# Patient Record
Sex: MALE | Race: WHITE | NOT HISPANIC OR LATINO | URBAN - METROPOLITAN AREA
[De-identification: names, ages, dates, MRNs, and addresses within clinical notes are randomized per-mention and may not be internally consistent; named-entity substitution may affect disease eponyms.]

---

## 2023-10-07 ENCOUNTER — EMERGENCY (EMERGENCY)
Facility: HOSPITAL | Age: 88
LOS: 1 days | Discharge: HOME | End: 2023-10-07
Attending: EMERGENCY MEDICINE
Payer: MEDICARE

## 2023-10-07 VITALS
HEIGHT: 73 IN | TEMPERATURE: 98 F | OXYGEN SATURATION: 98 % | WEIGHT: 205.03 LBS | HEART RATE: 75 BPM | SYSTOLIC BLOOD PRESSURE: 134 MMHG | RESPIRATION RATE: 20 BRPM | DIASTOLIC BLOOD PRESSURE: 83 MMHG

## 2023-10-07 LAB
ALBUMIN SERPL ELPH-MCNC: 4 G/DL — SIGNIFICANT CHANGE UP (ref 3.3–5.2)
ALP SERPL-CCNC: 69 U/L — SIGNIFICANT CHANGE UP (ref 40–120)
ALT FLD-CCNC: 15 U/L — SIGNIFICANT CHANGE UP
ANION GAP SERPL CALC-SCNC: 14 MMOL/L — SIGNIFICANT CHANGE UP (ref 5–17)
AST SERPL-CCNC: 22 U/L — SIGNIFICANT CHANGE UP
BASOPHILS # BLD AUTO: 0.03 K/UL — SIGNIFICANT CHANGE UP (ref 0–0.2)
BASOPHILS NFR BLD AUTO: 0.5 % — SIGNIFICANT CHANGE UP (ref 0–2)
BILIRUB SERPL-MCNC: 0.6 MG/DL — SIGNIFICANT CHANGE UP (ref 0.4–2)
BUN SERPL-MCNC: 18.9 MG/DL — SIGNIFICANT CHANGE UP (ref 8–20)
CALCIUM SERPL-MCNC: 9.4 MG/DL — SIGNIFICANT CHANGE UP (ref 8.4–10.5)
CHLORIDE SERPL-SCNC: 101 MMOL/L — SIGNIFICANT CHANGE UP (ref 96–108)
CO2 SERPL-SCNC: 22 MMOL/L — SIGNIFICANT CHANGE UP (ref 22–29)
CREAT SERPL-MCNC: 1.02 MG/DL — SIGNIFICANT CHANGE UP (ref 0.5–1.3)
EGFR: 72 ML/MIN/1.73M2 — SIGNIFICANT CHANGE UP
EOSINOPHIL # BLD AUTO: 0.29 K/UL — SIGNIFICANT CHANGE UP (ref 0–0.5)
EOSINOPHIL NFR BLD AUTO: 5 % — SIGNIFICANT CHANGE UP (ref 0–6)
GLUCOSE SERPL-MCNC: 116 MG/DL — HIGH (ref 70–99)
HCT VFR BLD CALC: 39.8 % — SIGNIFICANT CHANGE UP (ref 39–50)
HGB BLD-MCNC: 13.5 G/DL — SIGNIFICANT CHANGE UP (ref 13–17)
IMM GRANULOCYTES NFR BLD AUTO: 0.5 % — SIGNIFICANT CHANGE UP (ref 0–0.9)
LIDOCAIN IGE QN: 23 U/L — SIGNIFICANT CHANGE UP (ref 22–51)
LYMPHOCYTES # BLD AUTO: 1.47 K/UL — SIGNIFICANT CHANGE UP (ref 1–3.3)
LYMPHOCYTES # BLD AUTO: 25.4 % — SIGNIFICANT CHANGE UP (ref 13–44)
MCHC RBC-ENTMCNC: 30.3 PG — SIGNIFICANT CHANGE UP (ref 27–34)
MCHC RBC-ENTMCNC: 33.9 GM/DL — SIGNIFICANT CHANGE UP (ref 32–36)
MCV RBC AUTO: 89.2 FL — SIGNIFICANT CHANGE UP (ref 80–100)
MONOCYTES # BLD AUTO: 0.45 K/UL — SIGNIFICANT CHANGE UP (ref 0–0.9)
MONOCYTES NFR BLD AUTO: 7.8 % — SIGNIFICANT CHANGE UP (ref 2–14)
NEUTROPHILS # BLD AUTO: 3.52 K/UL — SIGNIFICANT CHANGE UP (ref 1.8–7.4)
NEUTROPHILS NFR BLD AUTO: 60.8 % — SIGNIFICANT CHANGE UP (ref 43–77)
NT-PROBNP SERPL-SCNC: 878 PG/ML — HIGH (ref 0–300)
PLATELET # BLD AUTO: 142 K/UL — LOW (ref 150–400)
POTASSIUM SERPL-MCNC: 4.2 MMOL/L — SIGNIFICANT CHANGE UP (ref 3.5–5.3)
POTASSIUM SERPL-SCNC: 4.2 MMOL/L — SIGNIFICANT CHANGE UP (ref 3.5–5.3)
PROT SERPL-MCNC: 7.2 G/DL — SIGNIFICANT CHANGE UP (ref 6.6–8.7)
RBC # BLD: 4.46 M/UL — SIGNIFICANT CHANGE UP (ref 4.2–5.8)
RBC # FLD: 13.1 % — SIGNIFICANT CHANGE UP (ref 10.3–14.5)
SODIUM SERPL-SCNC: 137 MMOL/L — SIGNIFICANT CHANGE UP (ref 135–145)
TROPONIN T SERPL-MCNC: <0.01 NG/ML — SIGNIFICANT CHANGE UP (ref 0–0.06)
WBC # BLD: 5.79 K/UL — SIGNIFICANT CHANGE UP (ref 3.8–10.5)
WBC # FLD AUTO: 5.79 K/UL — SIGNIFICANT CHANGE UP (ref 3.8–10.5)

## 2023-10-07 PROCEDURE — 73030 X-RAY EXAM OF SHOULDER: CPT

## 2023-10-07 PROCEDURE — 36415 COLL VENOUS BLD VENIPUNCTURE: CPT

## 2023-10-07 PROCEDURE — 83880 ASSAY OF NATRIURETIC PEPTIDE: CPT

## 2023-10-07 PROCEDURE — 84484 ASSAY OF TROPONIN QUANT: CPT

## 2023-10-07 PROCEDURE — 93005 ELECTROCARDIOGRAM TRACING: CPT

## 2023-10-07 PROCEDURE — 99285 EMERGENCY DEPT VISIT HI MDM: CPT | Mod: 25

## 2023-10-07 PROCEDURE — 73030 X-RAY EXAM OF SHOULDER: CPT | Mod: 26,LT

## 2023-10-07 PROCEDURE — 71045 X-RAY EXAM CHEST 1 VIEW: CPT

## 2023-10-07 PROCEDURE — 71045 X-RAY EXAM CHEST 1 VIEW: CPT | Mod: 26

## 2023-10-07 PROCEDURE — 93010 ELECTROCARDIOGRAM REPORT: CPT

## 2023-10-07 PROCEDURE — 99285 EMERGENCY DEPT VISIT HI MDM: CPT

## 2023-10-07 PROCEDURE — 80053 COMPREHEN METABOLIC PANEL: CPT

## 2023-10-07 PROCEDURE — 85025 COMPLETE CBC W/AUTO DIFF WBC: CPT

## 2023-10-07 PROCEDURE — 83690 ASSAY OF LIPASE: CPT

## 2023-10-07 RX ORDER — METHOCARBAMOL 500 MG/1
1000 TABLET, FILM COATED ORAL ONCE
Refills: 0 | Status: COMPLETED | OUTPATIENT
Start: 2023-10-07 | End: 2023-10-07

## 2023-10-07 RX ADMIN — METHOCARBAMOL 1000 MILLIGRAM(S): 500 TABLET, FILM COATED ORAL at 12:08

## 2023-10-07 NOTE — ED ADULT TRIAGE NOTE - CHIEF COMPLAINT QUOTE
BIBA from home c/o  non traumatic L shoulder pain for couple of weeks. Pt schedule for CT angio and eccho on Tuesday due to needs for valve replacement

## 2023-10-07 NOTE — ED PROVIDER NOTE - OBJECTIVE STATEMENT
88yom p/w left shoulder pain. Had a sharp pain in the left scapula that woke him from sleep this morning and has been lingering since. Has been getting pain like this on and off for a few weeks, no particular inciting event or triggers. Does endorses some associated SOB and HOLLIDAY. Had a recent cardiac cath that was clean, but is being worked up for an aortic valve replacement.

## 2023-10-07 NOTE — ED ADULT NURSE NOTE - NSFALLHARMRISKINTERV_ED_ALL_ED

## 2023-10-07 NOTE — ED PROVIDER NOTE - CLINICAL SUMMARY MEDICAL DECISION MAKING FREE TEXT BOX
Recent cath with no actionable CAD, nonspecific left shoulder pain, low suspicion this is ACS. ECG nonischemic appearing, troponin negative. XR with clear lungs, some degenerative changes to the left shoulder. Pain improved with robaxin, suspect this is likely MSK pain but has established cardiology follow up.

## 2023-10-07 NOTE — ED ADULT NURSE NOTE - OBJECTIVE STATEMENT
Pt care acquired at 1200. Pt is A&Ox4 and is resting in bed. Pt arrives to the ED c/o chest pain that started in middle of night. pt states he had cardiac cauterization last week that determined valve needed to be replaced. Pt states he has appointment scheduled for Tuesday to replace valve. Pt states pain has subsided on arrival to ED. P is nsr on the cardiac monitor. Pt is breathing unlabored on room air. Pt is former smoker. Skin tone is normal for race. VSS.

## 2023-10-07 NOTE — ED PROVIDER NOTE - PATIENT PORTAL LINK FT
You can access the FollowMyHealth Patient Portal offered by [facility] by registering at the following website: [email or web address]. By joining [facility]’s FollowMyHealth portal, you will also be able to view your health information using other applications (apps) compatible with our system.

## 2023-10-07 NOTE — ED PROVIDER NOTE - NSFOLLOWUPINSTRUCTIONS_ED_ALL_ED_FT
Take ibuprofen 600mg every 6 hours and/or acetaminophen 1000mg every 6 hours as needed for pain  FOllow up with your cardiologist at your established appointment   Return to the ER with any new, worsening or persistent symptoms.

## 2025-06-19 ENCOUNTER — EMERGENCY (EMERGENCY)
Facility: HOSPITAL | Age: 89
LOS: 1 days | End: 2025-06-19
Attending: EMERGENCY MEDICINE
Payer: MEDICARE

## 2025-06-19 VITALS
RESPIRATION RATE: 16 BRPM | TEMPERATURE: 98 F | HEART RATE: 91 BPM | OXYGEN SATURATION: 93 % | WEIGHT: 179.9 LBS | SYSTOLIC BLOOD PRESSURE: 180 MMHG | DIASTOLIC BLOOD PRESSURE: 86 MMHG

## 2025-06-19 VITALS
RESPIRATION RATE: 16 BRPM | TEMPERATURE: 98 F | OXYGEN SATURATION: 94 % | HEART RATE: 78 BPM | SYSTOLIC BLOOD PRESSURE: 160 MMHG | DIASTOLIC BLOOD PRESSURE: 90 MMHG

## 2025-06-19 PROCEDURE — 96372 THER/PROPH/DIAG INJ SC/IM: CPT

## 2025-06-19 PROCEDURE — 99284 EMERGENCY DEPT VISIT MOD MDM: CPT | Mod: GC

## 2025-06-19 PROCEDURE — 71046 X-RAY EXAM CHEST 2 VIEWS: CPT

## 2025-06-19 PROCEDURE — 71046 X-RAY EXAM CHEST 2 VIEWS: CPT | Mod: 26

## 2025-06-19 PROCEDURE — 73502 X-RAY EXAM HIP UNI 2-3 VIEWS: CPT

## 2025-06-19 PROCEDURE — 73502 X-RAY EXAM HIP UNI 2-3 VIEWS: CPT | Mod: 26,LT

## 2025-06-19 PROCEDURE — 99284 EMERGENCY DEPT VISIT MOD MDM: CPT | Mod: 25

## 2025-06-19 RX ORDER — KETOROLAC TROMETHAMINE 30 MG/ML
15 INJECTION, SOLUTION INTRAMUSCULAR; INTRAVENOUS ONCE
Refills: 0 | Status: DISCONTINUED | OUTPATIENT
Start: 2025-06-19 | End: 2025-06-19

## 2025-06-19 RX ORDER — LIDOCAINE HYDROCHLORIDE 20 MG/ML
1 JELLY TOPICAL ONCE
Refills: 0 | Status: COMPLETED | OUTPATIENT
Start: 2025-06-19 | End: 2025-06-19

## 2025-06-19 RX ORDER — METHOCARBAMOL 500 MG/1
1500 TABLET, FILM COATED ORAL ONCE
Refills: 0 | Status: COMPLETED | OUTPATIENT
Start: 2025-06-19 | End: 2025-06-19

## 2025-06-19 RX ADMIN — LIDOCAINE HYDROCHLORIDE 1 PATCH: 20 JELLY TOPICAL at 11:12

## 2025-06-19 RX ADMIN — METHOCARBAMOL 1500 MILLIGRAM(S): 500 TABLET, FILM COATED ORAL at 11:12

## 2025-06-19 RX ADMIN — KETOROLAC TROMETHAMINE 15 MILLIGRAM(S): 30 INJECTION, SOLUTION INTRAMUSCULAR; INTRAVENOUS at 11:12

## 2025-06-19 NOTE — ED PROVIDER NOTE - PHYSICAL EXAMINATION
Gen: AAOx3, NAD, well nourished  HEENT: Normocephalic atraumatic. EOMI. No scleral icterus. Moist mucus membranes.  CV: RRR. Audible S1 and S2. No murmurs. 2+ radial and PT pulses. No chest wall ttp.  Pulm: Clear to auscultation bilaterally. No wheezes, rales, or rhonchi. No accessory muscle use or respiratory distress.  Abdomen: soft, normoactive BS, non distended, nontender, no rebound, no guarding  Musculoskeletal:  Moving all extremities equally. No gross deformity. No tenderness to palpation. +Tightness and muscle spasm L IT band. Full AROM at L hip and knee.  Skin: No rashes or lesions. Warm. Small skin tear to lateral L knee.   Psych: Appropriate mood and affect. Cooperative.

## 2025-06-19 NOTE — ED ADULT TRIAGE NOTE - CHIEF COMPLAINT QUOTE
BIBEMS s/p mechanical fall at home last night while making dinner. C/o left hip pain. Denies head strike, LOC and anticoagulation medication.

## 2025-06-19 NOTE — ED PROVIDER NOTE - PATIENT PORTAL LINK FT
You can access the FollowMyHealth Patient Portal offered by [facility] by registering at the following website: [email or web address]. By joining [facility]’s FollowMyHealth portal, you will also be able to view your health information using other applications (apps) compatible with our system.

## 2025-06-19 NOTE — ED PROVIDER NOTE - PROGRESS NOTE DETAILS
Son at bedside. Patient complains of pain and not being able to walk. Offered PT eval and possible FELIZ, patient declined, son states he will take him home. Lives with his daughter and he is also able to come by and help.

## 2025-06-19 NOTE — ED PROVIDER NOTE - CLINICAL SUMMARY MEDICAL DECISION MAKING FREE TEXT BOX
Patient is an 89yo M with PMHx of valve repair who presents to the ED complaining of L hip pain s/p fall. Will get xrays and treat pain.

## 2025-06-19 NOTE — ED PROVIDER NOTE - NSFOLLOWUPINSTRUCTIONS_ED_ALL_ED_FT
RICE  RICE stands for rest, ice, compression, and elevation. Doing these things helps limit pain and swelling after an injury. RICE also helps injuries heal faster.   - Rest: Pain is your body’s way of telling you to rest an injured area. Whether you have hurt an elbow, hand, foot, or knee, limiting its use will prevent further injury and help you heal.  - Ice: Applying ice right after an injury helps prevent swelling and reduce pain. Don’t place ice directly on your skin. Use a cold pack. Or make an ice pack by putting ice cubes in a plastic bag that seals at the top. Wrap the cold pack or ice pack in a thin cloth. Place it over the injured area. Ice for  10 minutes every  3 hours. Don’t ice for more than  20 minutes at a time.  - Compression: Putting pressure (compression) on an injury helps prevent swelling and provides support. Wrap the injured area firmly with an elastic bandage. If your hand or foot tingles, becomes discolored, or feels cold to the touch, the bandage may be too tight. Rewrap it more loosely. If your bandage becomes too loose, rewrap it. Don't wear an elastic bandage overnight.  - Elevation: Keeping an injury elevated helps reduce swelling, pain, and throbbing. Elevation is most effective when the injury is kept elevated higher than the heart.    Return to the ED if:  - Fingers or toes feel numb, tingly, are cold to the touch, or change color.  - Skin looks shiny or tight.  - Pain, swelling, or bruising gets worse and isn't improved with elevation.  - Signs of infection. These include warm skin, redness, fluid leaking, or bad smell coming from the injured body part. - Follow up with your primary care doctor for a vascular surgery follow up.   - Stretch, massage, apply heat to your leg to help the pain.   - Take robaxin up to three times a day for muscle spasm   - Can continue with ibuprofen as needed for pain.

## 2025-06-19 NOTE — ED ADULT NURSE NOTE - NSFALLRISKINTERV_ED_ALL_ED

## 2025-06-19 NOTE — ED PROVIDER NOTE - ATTENDING CONTRIBUTION TO CARE
Patient is an 89yo M with PMHx of valve repair, arthritis who presents to the ED complaining of L hip pain s/p fall. States he was making dinner yesterday, turned, and tripped over his own legs. Has some L. hip pain. Xrays negative, pt feeling better, family declines admission, ready for d/c.

## 2025-06-19 NOTE — ED ADULT NURSE NOTE - OBJECTIVE STATEMENT
Pt is AxOx3, c/o Pt is AxOx3, c/o a mechanical fall yesterday, pt was making dinner and fell on his L side c/o L rib pain and leg pain. breathing even and unlabored. Pt ambuulates with a walker usually. Pt is aware of plan of care.

## 2025-06-19 NOTE — ED PROVIDER NOTE - OBJECTIVE STATEMENT
Patient is an 89yo M with PMHx of valve repair, arthritis who presents to the ED complaining of L hip pain s/p fall. States he was making dinner yesterday, turned, and tripped over his own legs. Was given a walker in the past but doesn't like to use it. Took ibuprofen x5 yesterday with no improvement in hip pain. Also had minor L chest wall pain which resolved with the ibuprofen. Denies head strike or LOC. Not on blood thinners. Patient states unable to walk or bear weight on LLE, but is able to move it when assisting with his hand. Was trying to do leg exercises to help the pain yesterday.